# Patient Record
Sex: MALE
[De-identification: names, ages, dates, MRNs, and addresses within clinical notes are randomized per-mention and may not be internally consistent; named-entity substitution may affect disease eponyms.]

---

## 2017-01-03 ENCOUNTER — RX ONLY (OUTPATIENT)
Age: 57
Setting detail: RX ONLY
End: 2017-01-03

## 2022-05-18 ENCOUNTER — OFFICE VISIT (OUTPATIENT)
Dept: URBAN - METROPOLITAN AREA CLINIC 41 | Facility: CLINIC | Age: 62
End: 2022-05-18
Payer: COMMERCIAL

## 2022-05-18 DIAGNOSIS — Z79.899 OTHER LONG TERM (CURRENT) DRUG THERAPY: Primary | ICD-10-CM

## 2022-05-18 DIAGNOSIS — H43.812 VITREOUS DEGENERATION, LEFT EYE: ICD-10-CM

## 2022-05-18 DIAGNOSIS — M06.9 RHEUMATOID ARTHRITIS, UNSPECIFIED: ICD-10-CM

## 2022-05-18 DIAGNOSIS — H35.372 PUCKERING OF MACULA, LEFT EYE: ICD-10-CM

## 2022-05-18 DIAGNOSIS — H25.13 AGE-RELATED NUCLEAR CATARACT, BILATERAL: ICD-10-CM

## 2022-05-18 PROCEDURE — 92134 CPTRZ OPH DX IMG PST SGM RTA: CPT | Performed by: OPHTHALMOLOGY

## 2022-05-18 PROCEDURE — 99204 OFFICE O/P NEW MOD 45 MIN: CPT | Performed by: OPHTHALMOLOGY

## 2022-05-18 ASSESSMENT — INTRAOCULAR PRESSURE
OD: 21
OS: 21

## 2022-05-18 NOTE — IMPRESSION/PLAN
Impression: Other long term (current) drug therapy: Z79.899. started 2020 for RA, now off humira - 400 mg/day, 215 lbs -> max daily dose ~ 476mg/day Plan: Exam and OCT demonstrate no toxicity seen on exam today. Discussed importance of regular exams to identify plaquenil toxicity early given the risk of irreversible vision loss. Fortunately, there is no evidence of this today. The patient is currently below the target dose noted above. Plaquenil doses below this threshold is associated with lower risk for the development of plaquenil toxicity in the future. Patient educated regarding this threshold and will follow up with PCP and rheumatologist to assess whether symptoms can be controlled with weekly Plaquenil doses below this threshold. Recommend follow up in 6 mo with general ophthalmologist for baseline HVF 10-2 RTC RCA 12 mo with FP OU, FA of macular OD>OS, and OCT OU

## 2022-05-18 NOTE — IMPRESSION/PLAN
Impression: Puckering of macula, left eye: H35.372. Plan: Exam and OCT demonstrate a very trace Macular Pucker. The diagnosis, natural history, and prognosis of ERMs, as well as the risks and benefits of PPV/MP versus observation were discussed at length. Given the patient's current visual acuity and minimal hindrance on activities of daily living, observation was recommended at this time. Currently, this is not affecting much of the macula; thus it is currently safe for him to continue plaquenil.

## 2023-05-17 ENCOUNTER — OFFICE VISIT (OUTPATIENT)
Facility: LOCATION | Age: 63
End: 2023-05-17
Payer: COMMERCIAL

## 2023-05-17 DIAGNOSIS — H35.372 PUCKERING OF MACULA, LEFT EYE: ICD-10-CM

## 2023-05-17 DIAGNOSIS — Z79.899 OTHER LONG TERM (CURRENT) DRUG THERAPY: Primary | ICD-10-CM

## 2023-05-17 DIAGNOSIS — M06.9 RHEUMATOID ARTHRITIS, UNSPECIFIED: ICD-10-CM

## 2023-05-17 DIAGNOSIS — H43.812 VITREOUS DEGENERATION, LEFT EYE: ICD-10-CM

## 2023-05-17 DIAGNOSIS — H25.13 AGE-RELATED NUCLEAR CATARACT, BILATERAL: ICD-10-CM

## 2023-05-17 PROCEDURE — 99214 OFFICE O/P EST MOD 30 MIN: CPT | Performed by: OPHTHALMOLOGY

## 2023-05-17 PROCEDURE — 92134 CPTRZ OPH DX IMG PST SGM RTA: CPT | Performed by: OPHTHALMOLOGY

## 2023-05-17 ASSESSMENT — INTRAOCULAR PRESSURE: OD: 11

## 2023-05-17 NOTE — IMPRESSION/PLAN
Impression: Other long term (current) drug therapy: Z79.899. started 2020 for RA, now off humira - 400 mg/day, 215 lbs -> max daily dose ~ 476mg/day Plan: Exam and OCT demonstrate no toxicity seen on exam today. Discussed importance of regular exams to identify plaquenil toxicity early given the risk of irreversible vision loss. Fortunately, there is no evidence of this today. The patient is currently below the target dose noted above. Plaquenil doses below this threshold is associated with lower risk for the development of plaquenil toxicity in the future. Patient educated regarding this threshold and will follow up with PCP and rheumatologist to assess whether symptoms can be controlled with weekly Plaquenil doses below this threshold. Recommend follow up in 6 mo with f/u HVF 10-2 RTC RCA 12 mo with FP OU, FA of macular OD>OS, and OCT OU

## 2024-05-06 ENCOUNTER — OFFICE VISIT (OUTPATIENT)
Dept: URBAN - METROPOLITAN AREA CLINIC 41 | Facility: CLINIC | Age: 64
End: 2024-05-06
Payer: COMMERCIAL

## 2024-05-06 DIAGNOSIS — H35.372 PUCKERING OF MACULA, LEFT EYE: ICD-10-CM

## 2024-05-06 DIAGNOSIS — M06.9 RHEUMATOID ARTHRITIS, UNSPECIFIED: ICD-10-CM

## 2024-05-06 DIAGNOSIS — Z79.899 OTHER LONG TERM (CURRENT) DRUG THERAPY: ICD-10-CM

## 2024-05-06 DIAGNOSIS — H25.13 AGE-RELATED NUCLEAR CATARACT, BILATERAL: Primary | ICD-10-CM

## 2024-05-06 DIAGNOSIS — H43.812 VITREOUS DEGENERATION, LEFT EYE: ICD-10-CM

## 2024-05-06 PROCEDURE — 92134 CPTRZ OPH DX IMG PST SGM RTA: CPT | Performed by: OPHTHALMOLOGY

## 2024-05-06 PROCEDURE — 92250 FUNDUS PHOTOGRAPHY W/I&R: CPT | Performed by: OPHTHALMOLOGY

## 2024-05-06 PROCEDURE — 92235 FLUORESCEIN ANGRPH MLTIFRAME: CPT | Performed by: OPHTHALMOLOGY

## 2024-05-06 PROCEDURE — 99214 OFFICE O/P EST MOD 30 MIN: CPT | Performed by: OPHTHALMOLOGY

## 2024-05-06 ASSESSMENT — INTRAOCULAR PRESSURE
OS: 15
OD: 16